# Patient Record
Sex: MALE | Race: OTHER | Employment: FULL TIME | URBAN - METROPOLITAN AREA
[De-identification: names, ages, dates, MRNs, and addresses within clinical notes are randomized per-mention and may not be internally consistent; named-entity substitution may affect disease eponyms.]

---

## 2017-06-16 ENCOUNTER — HOSPITAL ENCOUNTER (EMERGENCY)
Facility: HOSPITAL | Age: 32
Discharge: HOME | End: 2017-06-16
Payer: COMMERCIAL

## 2017-06-16 ENCOUNTER — HOSPITAL ENCOUNTER (OUTPATIENT)
Age: 32
Discharge: OTHER INSTITUTION | End: 2017-06-16
Payer: COMMERCIAL

## 2017-06-16 VITALS
OXYGEN SATURATION: 100 % | HEART RATE: 66 BPM | BODY MASS INDEX: 31.4 KG/M2 | RESPIRATION RATE: 16 BRPM | DIASTOLIC BLOOD PRESSURE: 72 MMHG | TEMPERATURE: 98 F | SYSTOLIC BLOOD PRESSURE: 128 MMHG | HEIGHT: 69 IN | WEIGHT: 212 LBS

## 2017-06-16 VITALS
SYSTOLIC BLOOD PRESSURE: 133 MMHG | WEIGHT: 212 LBS | TEMPERATURE: 98 F | HEART RATE: 71 BPM | HEIGHT: 69 IN | BODY MASS INDEX: 31.4 KG/M2 | OXYGEN SATURATION: 100 % | DIASTOLIC BLOOD PRESSURE: 83 MMHG | RESPIRATION RATE: 16 BRPM

## 2017-06-16 DIAGNOSIS — R10.9 ABDOMINAL PAIN, ACUTE: Primary | ICD-10-CM

## 2017-06-16 DIAGNOSIS — K29.00 ACUTE GASTRITIS WITHOUT HEMORRHAGE, UNSPECIFIED GASTRITIS TYPE: Primary | ICD-10-CM

## 2017-06-16 PROCEDURE — 81003 URINALYSIS AUTO W/O SCOPE: CPT

## 2017-06-16 PROCEDURE — 36415 COLL VENOUS BLD VENIPUNCTURE: CPT

## 2017-06-16 PROCEDURE — 80048 BASIC METABOLIC PNL TOTAL CA: CPT

## 2017-06-16 PROCEDURE — 99205 OFFICE O/P NEW HI 60 MIN: CPT

## 2017-06-16 PROCEDURE — 99283 EMERGENCY DEPT VISIT LOW MDM: CPT

## 2017-06-16 PROCEDURE — 85025 COMPLETE CBC W/AUTO DIFF WBC: CPT

## 2017-06-16 PROCEDURE — 83690 ASSAY OF LIPASE: CPT

## 2017-06-16 PROCEDURE — 80076 HEPATIC FUNCTION PANEL: CPT

## 2017-06-16 RX ORDER — DICYCLOMINE HYDROCHLORIDE 10 MG/1
10 CAPSULE ORAL
Qty: 20 CAPSULE | Refills: 0 | Status: SHIPPED | OUTPATIENT
Start: 2017-06-16 | End: 2017-06-23

## 2017-06-16 NOTE — ED INITIAL ASSESSMENT (HPI)
PATIENT ARRIVED AMBULATORY TO ROOM C/O DIARRHEA X4 DAYS. PT HAS H/O IBS. APPROXIMATELY 3 EPISODES OF DIARRHEA PER DAY. PT DENIES N/V. PT DENIES FEVERS. DENIES DYSURIA.  PATIENT STATES \"I KEEP MYSELF ON A HIGH PROTEIN AND HIGH FIBER DIET\" +MID EPIGASTRIC A

## 2017-06-16 NOTE — ED NOTES
Pt to ER with c/o some N/V/D since yesterday. No emesis since coming to ER. States no pain now, \"but I feel like I have to use the bathroom now. \"

## 2017-06-16 NOTE — ED INITIAL ASSESSMENT (HPI)
Since Sunday c/o RUQ abd pain, hx of IBS.  + nausea, c/o multiple episodes of diarrhea. Sent from immediate care for further evaluation.

## 2017-06-16 NOTE — ED PROVIDER NOTES
Patient presents with:  Diarrhea      HPI:     Benjamin Duke is a 28year old male who presents with a chief complaint of abdominal pain and diarrhea for the past 4-5 days. The patient has a history of IBS.   No history of surgeries to the abdomen in the past. murmur  EXTREMITIES: no cyanosis or edema. CHUA without difficulty  GI: soft, nondistended, normal bowel sounds and tender to the epigastric and RUQ with palpation. No CVA tenderness. No distention.     MDM/Assessment/Plan:   Orders for this encounter:    No

## 2017-06-16 NOTE — ED PROVIDER NOTES
Patient Seen in: [facility] Department    History   Patient presents with:  Abdomen/Flank Pain (GI/)    Stated Complaint: abd pain    HPI    44-year-old male, with a history of IBS, presents to the emergency department complaining of n is oriented to person, place, and time. He appears well-developed and well-nourished. HENT:   Head: Normocephalic. Eyes: Conjunctivae and EOM are normal.   Neck: Neck supple. Cardiovascular: Normal rate. No murmur heard.   Pulmonary/Chest: Effort n return for concerns      Disposition and Plan     Clinical Impression:  Acute gastritis without hemorrhage, unspecified gastritis type  (primary encounter diagnosis)    Disposition:  Discharge    Follow-up:  Virginia Mohamud MD  [address] [facility]

## 2017-06-23 ENCOUNTER — OFFICE VISIT (OUTPATIENT)
Dept: INTERNAL MEDICINE CLINIC | Facility: CLINIC | Age: 32
End: 2017-06-23

## 2017-06-23 VITALS
HEART RATE: 60 BPM | SYSTOLIC BLOOD PRESSURE: 122 MMHG | WEIGHT: 209 LBS | DIASTOLIC BLOOD PRESSURE: 76 MMHG | OXYGEN SATURATION: 97 % | BODY MASS INDEX: 31 KG/M2 | TEMPERATURE: 98 F

## 2017-06-23 DIAGNOSIS — Z09 ENCOUNTER FOR EXAMINATION FOLLOWING TREATMENT AT HOSPITAL: Primary | ICD-10-CM

## 2017-06-23 PROCEDURE — 99212 OFFICE O/P EST SF 10 MIN: CPT | Performed by: INTERNAL MEDICINE

## 2017-06-23 RX ORDER — DICYCLOMINE HYDROCHLORIDE 10 MG/1
10 CAPSULE ORAL
Qty: 30 CAPSULE | Refills: 0 | Status: SHIPPED | OUTPATIENT
Start: 2017-06-23

## 2017-06-23 NOTE — PATIENT INSTRUCTIONS
ASSESSMENT/PLAN:   Encounter for examination following treatment at hospital  (primary encounter diagnosis) - resolved, no abd pain, no nausea and  no diarrhea.

## 2017-06-23 NOTE — PROGRESS NOTES
HPI:    Patient ID: Deejay Stevenson is a 28year old male. HPI  Pt was in ER due to diarrhea and nausea and abd pain, all test were normal , send home on dicyclomine. Pt had been doing some diet change and thinks that is what precipitated.  He has hs of Conjunctivae and EOM are normal. Pupils are equal, round, and reactive to light. Neck: Normal range of motion. Neck supple. No thyromegaly present. Cardiovascular: Normal rate, regular rhythm, normal heart sounds and intact distal pulses.   Exam reveals

## 2019-05-22 ENCOUNTER — OFFICE VISIT (OUTPATIENT)
Dept: INTERNAL MEDICINE CLINIC | Facility: CLINIC | Age: 34
End: 2019-05-22

## 2019-05-22 VITALS
HEART RATE: 89 BPM | WEIGHT: 254 LBS | TEMPERATURE: 98 F | OXYGEN SATURATION: 98 % | DIASTOLIC BLOOD PRESSURE: 83 MMHG | RESPIRATION RATE: 19 BRPM | HEIGHT: 69 IN | SYSTOLIC BLOOD PRESSURE: 134 MMHG | BODY MASS INDEX: 37.62 KG/M2

## 2019-05-22 DIAGNOSIS — K58.2 IRRITABLE BOWEL SYNDROME WITH BOTH CONSTIPATION AND DIARRHEA: ICD-10-CM

## 2019-05-22 DIAGNOSIS — R10.84 ABDOMINAL PAIN, GENERALIZED: Primary | ICD-10-CM

## 2019-05-22 PROCEDURE — 99214 OFFICE O/P EST MOD 30 MIN: CPT | Performed by: INTERNAL MEDICINE

## 2019-05-22 RX ORDER — ONDANSETRON 4 MG/1
4 TABLET, FILM COATED ORAL EVERY 8 HOURS PRN
Qty: 20 TABLET | Refills: 0 | Status: SHIPPED | OUTPATIENT
Start: 2019-05-22 | End: 2021-07-22

## 2019-05-22 RX ORDER — RANITIDINE 150 MG/1
150 TABLET ORAL 2 TIMES DAILY
COMMUNITY

## 2019-05-22 RX ORDER — DICYCLOMINE HYDROCHLORIDE 10 MG/1
10 CAPSULE ORAL 4 TIMES DAILY
Qty: 90 CAPSULE | Refills: 3 | Status: SHIPPED | OUTPATIENT
Start: 2019-05-22 | End: 2019-06-01

## 2019-05-22 NOTE — PROGRESS NOTES
HPI:    Patient ID: Mendez Adkins is a 58 [address] old male.     HPI  Pt here with complaint of abdominal pain he says over the weekend he had some more heavy greasy food and he started feeling worse after few hours he is got generalized abdominal pain mos Disp:  Rfl:    Dicyclomine HCl 10 MG Oral Cap Take 1 capsule (10 mg total) by mouth 4 (four) times daily before meals and nightly.  Disp: 30 capsule Rfl: 0     Allergies:  Pcn [Penicillins]       RASH  Penicillins                PHYSICAL EXAM:   Physical Ex days.   • Ondansetron HCl (ZOFRAN) 4 mg tablet 20 tablet 0     Sig: Take 1 tablet (4 mg total) by mouth every 8 (eight) hours as needed for Nausea.        Imaging & Referrals:  None       LP#[id number]

## 2019-06-07 ENCOUNTER — OFFICE VISIT (OUTPATIENT)
Dept: INTERNAL MEDICINE CLINIC | Facility: CLINIC | Age: 34
End: 2019-06-07

## 2019-06-07 VITALS
HEIGHT: 69 IN | TEMPERATURE: 98 F | WEIGHT: 247 LBS | SYSTOLIC BLOOD PRESSURE: 127 MMHG | BODY MASS INDEX: 36.58 KG/M2 | RESPIRATION RATE: 17 BRPM | HEART RATE: 66 BPM | DIASTOLIC BLOOD PRESSURE: 85 MMHG

## 2019-06-07 DIAGNOSIS — K58.2 IRRITABLE BOWEL SYNDROME WITH BOTH CONSTIPATION AND DIARRHEA: ICD-10-CM

## 2019-06-07 DIAGNOSIS — H61.23 BILATERAL IMPACTED CERUMEN: ICD-10-CM

## 2019-06-07 DIAGNOSIS — Z00.00 ANNUAL PHYSICAL EXAM: Primary | ICD-10-CM

## 2019-06-07 DIAGNOSIS — R10.84 ABDOMINAL PAIN, GENERALIZED: ICD-10-CM

## 2019-06-07 PROCEDURE — 99395 PREV VISIT EST AGE 18-39: CPT | Performed by: INTERNAL MEDICINE

## 2019-06-07 NOTE — PROGRESS NOTES
HPI:    Patient ID: Karla Alanis is a 29year old male.     HPI  Pt comes annual physical exam overall he is doing okay he is she has symptoms initially were doing better but then again this week started with the symptoms of frequent going to the bath Allergies:  Penicillins               Pcn [Penicillins]       RASH    HISTORY:  Past Medical History:   Diagnosis Date   • Asthma    • Extrinsic asthma, unspecified    • History of ear surgery     removal of FB of ear   • IBS (irritable bowel syndrome) diarrhea- as above, watch diet , take Bentyl as need, follow with GI  Bilateral impacted cerumen- use OTC debrox if not better can come in to have them flushed right after using the drops     Orders Placed This Encounter      CBC W Differential W Platelet

## 2019-06-15 ENCOUNTER — LAB ENCOUNTER (OUTPATIENT)
Dept: LAB | Age: 34
End: 2019-06-15
Attending: INTERNAL MEDICINE
Payer: COMMERCIAL

## 2019-06-15 DIAGNOSIS — Z00.00 ANNUAL PHYSICAL EXAM: ICD-10-CM

## 2019-06-15 DIAGNOSIS — H61.23 BILATERAL IMPACTED CERUMEN: ICD-10-CM

## 2019-06-15 DIAGNOSIS — R10.84 ABDOMINAL PAIN, GENERALIZED: ICD-10-CM

## 2019-06-15 DIAGNOSIS — K58.2 IRRITABLE BOWEL SYNDROME WITH BOTH CONSTIPATION AND DIARRHEA: ICD-10-CM

## 2019-06-15 PROCEDURE — 80053 COMPREHEN METABOLIC PANEL: CPT

## 2019-06-15 PROCEDURE — 80061 LIPID PANEL: CPT

## 2019-06-15 PROCEDURE — 85025 COMPLETE CBC W/AUTO DIFF WBC: CPT

## 2019-06-15 PROCEDURE — 36415 COLL VENOUS BLD VENIPUNCTURE: CPT

## 2019-06-15 PROCEDURE — 81001 URINALYSIS AUTO W/SCOPE: CPT

## 2019-06-15 PROCEDURE — 84443 ASSAY THYROID STIM HORMONE: CPT

## 2019-06-21 ENCOUNTER — APPOINTMENT (OUTPATIENT)
Dept: LAB | Age: 34
End: 2019-06-21
Attending: INTERNAL MEDICINE
Payer: COMMERCIAL

## 2019-06-21 ENCOUNTER — TELEPHONE (OUTPATIENT)
Dept: INTERNAL MEDICINE CLINIC | Facility: CLINIC | Age: 34
End: 2019-06-21

## 2019-06-21 PROCEDURE — 81003 URINALYSIS AUTO W/O SCOPE: CPT | Performed by: INTERNAL MEDICINE

## 2019-07-23 NOTE — H&P
[facility], [facility] - Gastroenterology                                                                                                  Clinic History and Physical mouth daily. Disp:  Rfl:    bismuth subsalicylate 324 PG/49MB Oral Suspension Take 30 mL by mouth every 6 (six) hours as needed for Indigestion.  Disp:  Rfl:    Ondansetron HCl (ZOFRAN) 4 mg tablet Take 1 tablet (4 mg total) by mouth every 8 (eight) hours a Prescriptions      No prescriptions requested or ordered in this encounter     Imaging & Referrals:  None     Debbi Lucio MD  [facility], [facility] - Gastroenterology  7/24/2019

## 2019-07-24 ENCOUNTER — OFFICE VISIT (OUTPATIENT)
Dept: GASTROENTEROLOGY | Facility: CLINIC | Age: 34
End: 2019-07-24

## 2019-07-24 VITALS
HEIGHT: 69 IN | SYSTOLIC BLOOD PRESSURE: 152 MMHG | HEART RATE: 73 BPM | WEIGHT: 260 LBS | BODY MASS INDEX: 38.51 KG/M2 | DIASTOLIC BLOOD PRESSURE: 98 MMHG

## 2019-07-24 DIAGNOSIS — R10.9 ABDOMINAL PAIN, UNSPECIFIED ABDOMINAL LOCATION: ICD-10-CM

## 2019-07-24 DIAGNOSIS — R19.5 LOOSE STOOLS: Primary | ICD-10-CM

## 2019-07-24 PROCEDURE — 99243 OFF/OP CNSLTJ NEW/EST LOW 30: CPT | Performed by: INTERNAL MEDICINE

## 2019-07-24 NOTE — PATIENT INSTRUCTIONS
1. Get your stool test completed to look for inflammatory bowel disease    2. Get your blood test to look for celiac disease    3. Continue to exercise    4. Work on stress management    5. Pepto, IBGard, and dicyclomine are excellent for symptoms    6.  We

## 2019-09-17 ENCOUNTER — TELEPHONE (OUTPATIENT)
Dept: GASTROENTEROLOGY | Facility: CLINIC | Age: 34
End: 2019-09-17

## 2019-09-17 NOTE — TELEPHONE ENCOUNTER
Overdue letter mailed out to pt.      Labs:   CALPROTECTIN, FECAL    IMMUNOGLOBULIN A, QNANT    MISCELLANEOUS TESTING    TISSUE TRANSGLUTAMINASE AB IGA

## 2021-07-19 ENCOUNTER — HOSPITAL ENCOUNTER (OUTPATIENT)
Age: 36
Discharge: HOME | End: 2021-07-19
Attending: EMERGENCY MEDICINE
Payer: COMMERCIAL

## 2021-07-19 ENCOUNTER — APPOINTMENT (OUTPATIENT)
Dept: GENERAL RADIOLOGY | Age: 36
End: 2021-07-19
Attending: EMERGENCY MEDICINE
Payer: COMMERCIAL

## 2021-07-19 ENCOUNTER — TELEPHONE (OUTPATIENT)
Dept: CASE MANAGEMENT | Age: 36
End: 2021-07-19

## 2021-07-19 VITALS
HEART RATE: 93 BPM | BODY MASS INDEX: 38.21 KG/M2 | HEIGHT: 69 IN | SYSTOLIC BLOOD PRESSURE: 153 MMHG | WEIGHT: 258 LBS | OXYGEN SATURATION: 100 % | TEMPERATURE: 97 F | DIASTOLIC BLOOD PRESSURE: 88 MMHG | RESPIRATION RATE: 18 BRPM

## 2021-07-19 DIAGNOSIS — M54.50 LOW BACK PAIN, UNSPECIFIED BACK PAIN LATERALITY, UNSPECIFIED CHRONICITY, UNSPECIFIED WHETHER SCIATICA PRESENT: Primary | ICD-10-CM

## 2021-07-19 DIAGNOSIS — S39.012A STRAIN OF LUMBAR REGION, INITIAL ENCOUNTER: Primary | ICD-10-CM

## 2021-07-19 PROCEDURE — 72100 X-RAY EXAM L-S SPINE 2/3 VWS: CPT | Performed by: EMERGENCY MEDICINE

## 2021-07-19 PROCEDURE — 99203 OFFICE O/P NEW LOW 30 MIN: CPT

## 2021-07-19 RX ORDER — CYCLOBENZAPRINE HCL 10 MG
10 TABLET ORAL 3 TIMES DAILY PRN
Qty: 20 TABLET | Refills: 0 | Status: SHIPPED | OUTPATIENT
Start: 2021-07-19 | End: 2021-07-26

## 2021-07-19 NOTE — ED INITIAL ASSESSMENT (HPI)
Pt presents to the IC with c/o lower back pain, right worse than left for the last 2 weeks s/p attempting to perform a 'dead lift' after taking a break from lifting. Pt notes pain that radiates to the buttock. No loss of bowel/bladder function.

## 2021-07-19 NOTE — TELEPHONE ENCOUNTER
Patient has lower back pain for few weeks and went to immediate care was told to follow up with Dr. Jamie Jain Aug 4th     Please advise     Please sign off referral    Thanks    [address] [place]

## 2021-07-19 NOTE — ED PROVIDER NOTES
Patient Seen in: [facility]      History   Patient presents with:  Back Pain    Stated Complaint: back pain    HPI/Subjective:   HPI    Patient presents to the emergency department complaining of back pain for the last 2 weeks.   He states marisela distress. Breath sounds: Normal breath sounds. Abdominal:      General: There is no distension. Palpations: Abdomen is soft. Tenderness: There is no abdominal tenderness.    Musculoskeletal:      Cervical back: Normal range of motion and ne 10 MG Oral Tab  Take 1 tablet (10 mg total) by mouth 3 (three) times daily as needed for Muscle spasms.   Qty: 20 tablet Refills: 0

## 2021-07-19 NOTE — TELEPHONE ENCOUNTER
CSS=please call and assist to schedule -see Dr Luna Torres note below. NO upcoming appointment to see Dr Rae Poe.

## 2021-07-20 ENCOUNTER — OFFICE VISIT (OUTPATIENT)
Dept: INTERNAL MEDICINE CLINIC | Facility: CLINIC | Age: 36
End: 2021-07-20

## 2021-07-20 VITALS
HEIGHT: 69 IN | DIASTOLIC BLOOD PRESSURE: 86 MMHG | HEART RATE: 78 BPM | OXYGEN SATURATION: 99 % | BODY MASS INDEX: 37.77 KG/M2 | WEIGHT: 255 LBS | SYSTOLIC BLOOD PRESSURE: 132 MMHG | RESPIRATION RATE: 18 BRPM | TEMPERATURE: 98 F

## 2021-07-20 DIAGNOSIS — M54.41 ACUTE BILATERAL LOW BACK PAIN WITH BILATERAL SCIATICA: Primary | ICD-10-CM

## 2021-07-20 DIAGNOSIS — M54.42 ACUTE BILATERAL LOW BACK PAIN WITH BILATERAL SCIATICA: Primary | ICD-10-CM

## 2021-07-20 PROCEDURE — 3075F SYST BP GE 130 - 139MM HG: CPT | Performed by: INTERNAL MEDICINE

## 2021-07-20 PROCEDURE — 99213 OFFICE O/P EST LOW 20 MIN: CPT | Performed by: INTERNAL MEDICINE

## 2021-07-20 PROCEDURE — 3079F DIAST BP 80-89 MM HG: CPT | Performed by: INTERNAL MEDICINE

## 2021-07-20 PROCEDURE — 3008F BODY MASS INDEX DOCD: CPT | Performed by: INTERNAL MEDICINE

## 2021-07-20 RX ORDER — METHYLPREDNISOLONE 4 MG/1
TABLET ORAL
Qty: 1 EACH | Refills: 0 | Status: SHIPPED | OUTPATIENT
Start: 2021-07-20 | End: 2021-08-04 | Stop reason: ALTCHOICE

## 2021-07-22 NOTE — PROGRESS NOTES
HPI/Subjective:   Patient ID: Arlette Greenwood is a 39year old male.     HPI  Seen with complaint of low back pain with sometimes radiation to bilateral buttock area patient says she has been doing some heavy lifting in the gym and next day woke up with Eyes:      Conjunctiva/sclera: Conjunctivae normal.      Pupils: Pupils are equal, round, and reactive to light. Cardiovascular:      Rate and Rhythm: Normal rate and regular rhythm. Heart sounds: Normal heart sounds.    Pulmonary:      Effort: Pul

## 2021-08-04 ENCOUNTER — TELEPHONE (OUTPATIENT)
Dept: NEUROLOGY | Facility: CLINIC | Age: 36
End: 2021-08-04

## 2021-08-04 ENCOUNTER — OFFICE VISIT (OUTPATIENT)
Dept: NEUROLOGY | Facility: CLINIC | Age: 36
End: 2021-08-04

## 2021-08-04 VITALS
BODY MASS INDEX: 37.77 KG/M2 | HEART RATE: 79 BPM | WEIGHT: 255 LBS | SYSTOLIC BLOOD PRESSURE: 130 MMHG | HEIGHT: 69 IN | DIASTOLIC BLOOD PRESSURE: 89 MMHG

## 2021-08-04 DIAGNOSIS — M54.16 ACUTE RIGHT LUMBAR RADICULOPATHY: Primary | ICD-10-CM

## 2021-08-04 PROCEDURE — 3008F BODY MASS INDEX DOCD: CPT | Performed by: PHYSICAL MEDICINE & REHABILITATION

## 2021-08-04 PROCEDURE — 3079F DIAST BP 80-89 MM HG: CPT | Performed by: PHYSICAL MEDICINE & REHABILITATION

## 2021-08-04 PROCEDURE — 99244 OFF/OP CNSLTJ NEW/EST MOD 40: CPT | Performed by: PHYSICAL MEDICINE & REHABILITATION

## 2021-08-04 PROCEDURE — 3075F SYST BP GE 130 - 139MM HG: CPT | Performed by: PHYSICAL MEDICINE & REHABILITATION

## 2021-08-04 RX ORDER — MELOXICAM 15 MG/1
15 TABLET ORAL DAILY
Qty: 14 TABLET | Refills: 0 | Status: SHIPPED | OUTPATIENT
Start: 2021-08-04 | End: 2021-08-18

## 2021-08-04 NOTE — PATIENT INSTRUCTIONS
-Start physical therapy and home exercises  -Mobic daily for the next 7-10 days and then as needed  -Ice/Heat as tolerated  -Please stop the medication if you have any side effects and call the office if you have any questions or concerns  -If no better wi

## 2021-08-04 NOTE — TELEPHONE ENCOUNTER
Submitted request to [facility] for Physical Therapy CPT [phone]     Status: pending [facility] clinical review

## 2021-08-04 NOTE — PROGRESS NOTES
[address]  NEW PATIENT EVALUATION    Consultation as a request of Dr. Francisco Orellana    Chief Complaint: back pain.     HISTORY OF PRESENT ILLNESS:   Patient presents with:  Low Back Pain: New patient c/o low back pa tablet (15 mg total) by mouth daily for 14 days. 14 tablet 0   • PATIENT SUPPLIED MEDICATION       • Calcium Carbonate Antacid (TUMS OR) Take by mouth. • raNITIdine HCl 150 MG Oral Tab Take 150 mg by mouth 2 (two) times daily.      • Alpha-D-Galactosida /89   Pulse 79   Ht 69\"   Wt 255 lb (115.7 kg)   BMI 37.66 kg/m²   General: No immediate distress  Head: Normocephalic/ Atraumatic  Eyes: Extra-occular movements intact.    Ears: No auricular hematoma or deformities  Mouth: No lesions or ulceration 0.3 06/15/2019    TP 7.5 06/15/2019    ALB 3.6 06/15/2019    GLOBULIN 3.9 06/15/2019     06/15/2019    K 4.5 06/15/2019     06/15/2019    CO2 29.0 06/15/2019     No results found for: PTP, PT, INR  No results found for: VITD, QVITD, VITD25, VIT

## 2021-08-12 NOTE — TELEPHONE ENCOUNTER
Physical Therapy 8 Sessions- Approved  Expires 11/12/21      Notified patient via [address] [place] [address] [address]

## 2021-08-12 NOTE — TELEPHONE ENCOUNTER
Incoming call from , patient is requesting status on 8/04/21 PT request.   Patient states cant schedule appt due to pending approval      Will call [facility] to notify of Referral # [id number].  Spoke to Stefany Gomez who states turn around time currently is 5 da

## 2021-08-18 ENCOUNTER — TELEPHONE (OUTPATIENT)
Dept: NEUROLOGY | Facility: CLINIC | Age: 36
End: 2021-08-18

## 2021-08-18 NOTE — TELEPHONE ENCOUNTER
Patient wasn't able to schedule with Lisandra earlier, patient was given information to different location [place], [facility] and to see if anyone with similar technique to [facility] to see if he can be seen sooner.       Dr. Shyann Lamar tx states MRI would be ne

## 2021-08-18 NOTE — TELEPHONE ENCOUNTER
Patient was given information for [facility] and was told if he can get in with an earlier therapist it would be okay.

## 2021-08-18 NOTE — TELEPHONE ENCOUNTER
Pt was not able to schedule PT right away, his first appt is not until Sept.  Please call patient to discuss what he can do in the meantime

## 2021-09-14 ENCOUNTER — TELEPHONE (OUTPATIENT)
Dept: PHYSICAL THERAPY | Facility: HOSPITAL | Age: 36
End: 2021-09-14

## 2021-09-17 ENCOUNTER — TELEPHONE (OUTPATIENT)
Dept: PHYSICAL THERAPY | Facility: HOSPITAL | Age: 36
End: 2021-09-17

## 2021-09-23 ENCOUNTER — OFFICE VISIT (OUTPATIENT)
Dept: PHYSICAL THERAPY | Age: 36
End: 2021-09-23
Attending: PHYSICAL MEDICINE & REHABILITATION
Payer: COMMERCIAL

## 2021-09-23 DIAGNOSIS — M54.16 ACUTE RIGHT LUMBAR RADICULOPATHY: ICD-10-CM

## 2021-09-23 PROCEDURE — 97530 THERAPEUTIC ACTIVITIES: CPT

## 2021-09-23 PROCEDURE — 97161 PT EVAL LOW COMPLEX 20 MIN: CPT

## 2021-09-23 PROCEDURE — 97110 THERAPEUTIC EXERCISES: CPT

## 2021-09-23 NOTE — PROGRESS NOTES
LUMBAR SPINE EVALUATION:   Referring Physician: Dr. Mike Sandoval  Diagnosis: Acute right lumbar radiculopathy (M54.16) Date of Service: 9/23/2021     PATIENT SUMMARY   Raymundo Hogan is a 39year old y/o male who presents to therapy today with complaints o pain free and unlimited function. Pt goals include: get rid of the pain and get back to where he was before, learn ways to avoid future flareup. Past medical history was reviewed with Bruno Glez.  Significant findings include:   Past Medical History: None     OBJECTIVE:   Observation/Posture: good posture    Gait: good gait mechanics    ROM:   Trunk         Pain (+/-)   Flexion 25% limited + L side glut   Extension 25% limited + R side pain   R Sidebend Full + L side LBP   L Sidebend FUll -   R Rotatio a HEP for: Tra activation, pelvic clocks 12<>6, isometric core    Charges: PT Eval Low Complexity, TherEx x1, TherAct x1     Total Timed Treatment: 22 min     Total Treatment Time: 55 min     Based on clinical rationale and outcome measures, this evaluatio 9/23/2021  To:12/22/2021

## 2021-09-28 ENCOUNTER — OFFICE VISIT (OUTPATIENT)
Dept: PHYSICAL THERAPY | Age: 36
End: 2021-09-28
Attending: PHYSICAL MEDICINE & REHABILITATION
Payer: COMMERCIAL

## 2021-09-28 DIAGNOSIS — M54.16 ACUTE RIGHT LUMBAR RADICULOPATHY: ICD-10-CM

## 2021-09-28 PROCEDURE — 97110 THERAPEUTIC EXERCISES: CPT

## 2021-09-28 NOTE — PROGRESS NOTES
Diagnosis: Acute right lumbar radiculopathy (M54.16)  Insurance (Authorized # of Visits):  BCBS PPO (8-10 per POC)  Authorizing Physician: Dr. Valentina Ratliff  Next MD visit: none scheduled  Fall Risk: standard         Precautions: n/a           Cancellations: 0   N LE extended 3x10 R/L    Seated lat pull downs 30# handles cable column 30x    Standing low rows cable column 17# handles 30x    Standing low rows BTB review as option for home    Plank on elbows and feet [id number]    Supine pelvic clocks 12<>6 20x     Ma

## 2021-10-04 ENCOUNTER — OFFICE VISIT (OUTPATIENT)
Dept: PHYSICAL THERAPY | Age: 36
End: 2021-10-04
Attending: PHYSICAL MEDICINE & REHABILITATION
Payer: COMMERCIAL

## 2021-10-04 PROCEDURE — 97110 THERAPEUTIC EXERCISES: CPT

## 2021-10-04 NOTE — PROGRESS NOTES
Diagnosis: Acute right lumbar radiculopathy (M54.16)  Insurance (Authorized # of Visits):  BCBS PPO (8-10 per POC)  Authorizing Physician: Dr. Andriy Ward MD visit: none scheduled  Fall Risk: standard         Precautions: n/a           Cancellations: 0   N downs 30# handles cable column 30x    Standing low rows cable column 17# handles 30x    Standing low rows BTB review as option for home    Plank on elbows and feet [id number]    Supine pelvic clocks 12<>6 20x Supine pelvic clocks 12<>6 20x    Supine isome

## 2021-10-08 ENCOUNTER — OFFICE VISIT (OUTPATIENT)
Dept: PHYSICAL THERAPY | Age: 36
End: 2021-10-08
Attending: PHYSICAL MEDICINE & REHABILITATION
Payer: COMMERCIAL

## 2021-10-08 PROCEDURE — 97110 THERAPEUTIC EXERCISES: CPT

## 2021-10-08 NOTE — PROGRESS NOTES
Diagnosis: Acute right lumbar radiculopathy (M54.16)  Insurance (Authorized # of Visits):  BCBS PPO (8-10 per POC)  Authorizing Physician: Dr. Shiloh Bustos  Next MD visit: none scheduled  Fall Risk: standard         Precautions: n/a           Cancellations: 0   N column 30x    Standing low rows cable column 17# handles 30x    Standing low rows BTB review as option for home    Plank on elbows and feet [id number]    Supine pelvic clocks 12<>6 20x Supine pelvic clocks 12<>6 20x    Supine isometric core 65cm ball 10x1

## 2021-10-19 ENCOUNTER — OFFICE VISIT (OUTPATIENT)
Dept: PHYSICAL THERAPY | Age: 36
End: 2021-10-19
Attending: PHYSICAL MEDICINE & REHABILITATION
Payer: COMMERCIAL

## 2021-10-19 PROCEDURE — 97110 THERAPEUTIC EXERCISES: CPT

## 2021-10-19 NOTE — PROGRESS NOTES
Diagnosis: Acute right lumbar radiculopathy (M54.16)  Insurance (Authorized # of Visits):  BCBS PPO (8-10 per POC)  Authorizing Physician: Dr. Dejuan Coppola  Next MD visit: none scheduled  Fall Risk: standard         Precautions: n/a           Cancellations: 0   N performance of gym exercises to decrease risk for reinjury.  - in progress        Date: 9/28/21  Tx #:2 Date: 10/4/21  Tx#: 3 Date:10/7/21  Tx#: 4 Date: 10/19/21  Tx#: 5   Therapeutic exercises Supine pelvic clocks 12<>6 20x    Supine isometric core [id number] Neuromuscular Re-education         Current HEP:   9/23: Tra activation, pelvic clocks 12<>6, isometric core  9/28: plank on elbow and toes, low rows cable or theraband, SL bridge - handout issued  10/4: Side plank, progress isometric core/march to alt LE

## 2021-10-21 ENCOUNTER — APPOINTMENT (OUTPATIENT)
Dept: PHYSICAL THERAPY | Age: 36
End: 2021-10-21
Attending: PHYSICAL MEDICINE & REHABILITATION
Payer: COMMERCIAL

## 2021-10-22 ENCOUNTER — OFFICE VISIT (OUTPATIENT)
Dept: PHYSICAL THERAPY | Age: 36
End: 2021-10-22
Attending: PHYSICAL MEDICINE & REHABILITATION
Payer: COMMERCIAL

## 2021-10-22 PROCEDURE — 97110 THERAPEUTIC EXERCISES: CPT

## 2021-10-22 NOTE — PROGRESS NOTES
Diagnosis: Acute right lumbar radiculopathy (M54.16)  Insurance (Authorized # of Visits):  BCBS PPO (8-10 per POC)  Authorizing Physician: Dr. Rebecca Lundborg  Next MD visit: none scheduled  Fall Risk: standard         Precautions: n/a           Cancellations: 0   N prone sustained hold at wall [id number]    Serratus slides lateral pull at wall [id number] RTB    Standing horiz abd alt palms up then palms down 10x ea dir R/L BTB    Standing oblique rotations 13# 30x R/L    Standing low rows 17# handles 30x   Supine pelvic c perform core strengthening 3x/wk    Plan: Pt to increase compliance with program.     Charges:  TherEx x3       Total Timed Treatment: 40 min  Total Treatment Time: 40 min

## 2021-10-26 ENCOUNTER — OFFICE VISIT (OUTPATIENT)
Dept: PHYSICAL THERAPY | Age: 36
End: 2021-10-26
Attending: PHYSICAL MEDICINE & REHABILITATION
Payer: COMMERCIAL

## 2021-10-26 PROCEDURE — 97110 THERAPEUTIC EXERCISES: CPT

## 2021-10-26 NOTE — PROGRESS NOTES
Diagnosis: Acute right lumbar radiculopathy (M54.16)  Insurance (Authorized # of Visits):  BCBS PPO (8-10 per POC)  Authorizing Physician: Dr. Mai Parr  Next MD visit: none scheduled  Fall Risk: standard         Precautions: n/a           Cancellations: 0   N slides lateral pull at wall [id number] RTB    Standing horiz abd alt palms up then palms down 10x ea dir R/L BTB    Standing oblique rotations 13# 30x R/L    Standing low rows 17# handles 30x   Sidely thoracic rotations 10x R/L    Supine pelvic clocks 12<>6 2 Total Timed Treatment: 40 min  Total Treatment Time: 40 min

## 2021-10-28 ENCOUNTER — OFFICE VISIT (OUTPATIENT)
Dept: PHYSICAL THERAPY | Age: 36
End: 2021-10-28
Attending: PHYSICAL MEDICINE & REHABILITATION
Payer: COMMERCIAL

## 2021-10-28 PROCEDURE — 97110 THERAPEUTIC EXERCISES: CPT

## 2021-10-28 NOTE — PROGRESS NOTES
ProgressSummary  Pt has attended 8 visits in Physical Therapy.      Diagnosis: Acute right lumbar radiculopathy (M54.16)  Insurance (Authorized # of Visits):  [facility] PPO (8-10 per POC)  Authorizing Physician: Dr. Dejuan Coppola  Next MD visit: none scheduled  Fall R DF (L4) 5/5 5/5     EHL (L5) 5/5 5/5     Ankle PF (S1) 5/5 5/5     Hip Abduction 5/5 5/5     Glut max 5/5 5/5     Glut med 5/5 5/5        Core: 5/5 minimal TrA activation     Flexibility:     R L   Hamstrings Min restrict Min restrict   Piriformis Min rest of treatment and while under my care.     X___________________________________________________ Date____________________    Certification From: 59/15/3714  To:1/26/2022          Date: 10/22/21  Tx #:6 Date: 10/26/21  Tx#: 7 Date:10/28/21  Tx#: 8 Date: 10/19/ Re-education         Current HEP:   9/23: Tra activation, pelvic clocks 12<>6, isometric core  9/28: plank on elbow and toes, low rows cable or theraband, SL bridge - handout issued  10/4: Side plank, progress isometric core/march to alt LE ext, SB plank s

## (undated) NOTE — LETTER
09/17/19        [address]  [address] [facility]      Dear Mariella Watts,    Our records indicate that you have outstanding lab work and or testing that was ordered for you and has not yet been completed:     CALPROTECTIN, FECAL

## (undated) NOTE — ED AVS SNAPSHOT
[facility] Emergency Department    [address]     [place] [address]    Phone:  [phone]    Fax:  [phone]           Jenn Haddad   MRN: [id number]    Department:  [facility] Emergency Department   Date of Visit:  6/1 coverage and benefits available for follow-up care and referrals. If you have difficulty scheduling your follow-up appointment as directed, please call our  at [phone].      Si tiene problemas para programar giles russ de seguimiento s different from what your Primary Care doctor has instructed you - please continue to take your medications as instructed by your Primary Care doctor until you can check with your doctor. Please bring the medication list to your next doctor's appointment. can help with your Affordable Care Act coverage, as well as all types of Medicaid plans. To get signed up and covered, please call [phone] and ask to get set up for an insurance coverage that is in-network with [facility]

## (undated) NOTE — MR AVS SNAPSHOT
[address] [facility] [address]  [phone]               Thank you for choosing us for your health care visit with Bubba Emery MD.  We are glad to serve you and happy to provide you with this summary of your visit. information, go to [email or web address] and click on the Sign Up Now link in the [employer] box. Enter your [employer] Activation Code exactly as it appears below along with your Zip Code and Date of Birth to complete the sign-up process.  If you do You don’t need to join a gym. Home exercises work great.  Put more priority on exercise in your life                    Visit [facility] online at  [email or web address]

## (undated) NOTE — ED AVS SNAPSHOT
[facility] Emergency Department    [address]     [place] [address]    Phone:  [phone]    Fax:  [phone]           Soham Lockwood   MRN: [id number]    Department:  [facility] Emergency Department   Date of Visit:  6/1 and Class Registration line at [phone] or find a doctor online by visiting [email or web address].    IF THERE IS ANY CHANGE OR WORSENING OF YOUR CONDITION, CALL YOUR PRIMARY CARE PHYSICIAN AT ONCE OR RETURN IMMEDIATELY TO [address] [place] [address].     If